# Patient Record
Sex: MALE | URBAN - METROPOLITAN AREA
[De-identification: names, ages, dates, MRNs, and addresses within clinical notes are randomized per-mention and may not be internally consistent; named-entity substitution may affect disease eponyms.]

---

## 2022-05-23 ENCOUNTER — OFFICE VISIT (OUTPATIENT)
Dept: ENT CLINIC | Age: 77
End: 2022-05-23
Payer: MEDICARE

## 2022-05-23 VITALS
RESPIRATION RATE: 20 BRPM | HEART RATE: 82 BPM | HEIGHT: 67 IN | SYSTOLIC BLOOD PRESSURE: 138 MMHG | WEIGHT: 228.8 LBS | BODY MASS INDEX: 35.91 KG/M2 | DIASTOLIC BLOOD PRESSURE: 80 MMHG | OXYGEN SATURATION: 94 %

## 2022-05-23 DIAGNOSIS — R09.89 GLOBUS SYNDROME: ICD-10-CM

## 2022-05-23 DIAGNOSIS — K11.8 PAROTID MASS: Primary | ICD-10-CM

## 2022-05-23 DIAGNOSIS — R13.14 PHARYNGOESOPHAGEAL DYSPHAGIA: ICD-10-CM

## 2022-05-23 PROCEDURE — 99204 OFFICE O/P NEW MOD 45 MIN: CPT | Performed by: OTOLARYNGOLOGY

## 2022-05-23 RX ORDER — GUAIFENESIN 100 MG/5ML
81 LIQUID (ML) ORAL DAILY
COMMUNITY

## 2022-05-23 RX ORDER — ATORVASTATIN CALCIUM 10 MG/1
TABLET, FILM COATED ORAL DAILY
COMMUNITY

## 2022-05-23 RX ORDER — GABAPENTIN 300 MG/1
300 CAPSULE ORAL EVERY MORNING
COMMUNITY

## 2022-05-23 NOTE — LETTER
5/23/2022    Patient: Laine Gonzalez   YOB: 1945   Date of Visit: 5/23/2022     Liane Tong, [address]  Via Fax: [phone]    Dear Liane Tong MD,      Thank you for referring Mr. Laine Gonzalez to [facility] [address] for evaluation. My notes for this consultation are attached. If you have questions, please do not hesitate to call me. I look forward to following your patient along with you.       Sincerely,    Lauren Holman MD

## 2022-05-23 NOTE — PROGRESS NOTES
Otolaryngology-Head and Neck Surgery  New Patient Visit     Patient: Page Cassidy  YOB: 1945  MRN: [id number]  Date of Service: 5/23/2022    Chief Complaint:  Throat concerns     History of Present Illness: Page Cassidy is a 78y.o. 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 Past Surgical History:   History reviewed. No pertinent surgical history. Medications:   Current Outpatient Medications   Medication Instructions    apixaban (ELIQUIS) 5 mg, Oral, 2 TIMES DAILY    aspirin 81 mg, Oral, DAILY    atorvastatin (LIPITOR) 10 mg tablet Oral, DAILY    gabapentin (NEURONTIN) 300 mg, Oral, EVERY MORNING       Allergies:   Not on File    Social History:   Social History     Tobacco Use    Smoking status: Former Smoker    Smokeless tobacco: Never Used   Vaping Use    Vaping Use: Never used   Substance Use Topics    Alcohol use: Never    Drug use: Never        Family History:  History reviewed. No pertinent family history. Review of Systems:    Consitutional: denies fever, excessive weight gain or loss. Eyes: denies diplopia, eye pain. Integumentary: denies new concerning skin lesions. Ears, Nose, Mouth, Throat: denies except as per HPI.   Endocrine: denies hot or cold intolerance, increased thirst.  Respiratory: denies cough, hemoptysis, wheezing  Gastrointestinal: denies trouble swallowing, nausea, emesis, regurgitation  Musculoskeletal: denies muscle weakness or wasting  Cardiovascular: denies chest pain, shortness of breath  Neurologic: denies seizures, numbness or tingling, syncope  Hematologic: denies easy bleeding or bruising    Physical Examination:   Vitals:    05/23/22 1430   BP: 138/80   Pulse: 82   Resp: 20   Height: 5' 7\" (1.702 m)   Weight: 228 lb 12.8 oz (103.8 kg)   SpO2: 94%        General: Comfortable, pleasant, appears stated age  Voice: Strong, speaking in full sentences, no stridor. Gravely voice quality   Face: No masses or lesions, facial strength symmetric   Ears: External ears unremarkable. Bilateral ear canal clear. Tympanic membrane clear and intact, with visible landmarks. Clear middle ear space  Nose: External nose unremarkable. Dorsum midline. Anterior rhinoscopy demonstrates no lesions. Septum midline. Turbinates without hypertrophy. Oral Cavity / Oropharynx: No trismus. Mucosa pink and moist. No lesions. Tongue is midline and mobile. Palate elevates symmetrically. Uvula midline. Tonsils unremarkable. Base of tongue soft. Floor of mouth soft. Neck: Fairly large palpable tail of parotid mass, left neck. No other adenopathy appreciated    Neurologic: CN II - XI intact. Normal gait      Assessment and Plan:   1. Parotid masses  - Limited information but on referral note, he has incidental finding of parotid mass bilateral on CT imaging  - I dont have a copy of the actual imaging  - Will request CT from [facility]  - Depending on this, consider checking dedicated CT neck with contrast if needed  - Otherwise will then plan for neck FNA  - Readily palpable left parotid mass on exam today    2. Globus  3.  Dysphagia  - Discussed with patient role of scope exam to better evaluate larynx and upper portion of swallowing system  - He is not keen on this today as he's been driving several hours between [place]   - Understands that this is next step  - Will arrange barium swallow   - Plan scope exam NOV    Discussed that we can find ENT closer to him in [place] if he'd like  He'd like to continue care with us     Will try and arrange next steps (imaging, FNA, barium swallow) in [place]   (I will order after I get a copy of his CT imaging from ER visit)        The patient was instructed to return to clinic if no improvement or progression of symptoms. Signs to watch out for reviewed.       MD [address] [facility]  [address] [place] [address]  [facility]  Office Phone: [phone]

## 2022-05-23 NOTE — PROGRESS NOTES
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         Visit Vitals  /80   Pulse 82   Resp 20   Ht 5' 7\" (1.702 m)   Wt 228 lb 12.8 oz (103.8 kg)   SpO2 94%   BMI 35.84 kg/m²

## 2022-05-26 ENCOUNTER — DOCUMENTATION ONLY (OUTPATIENT)
Dept: ENT CLINIC | Age: 77
End: 2022-05-26

## 2022-05-26 NOTE — PROGRESS NOTES
CT reviewed - bilaterla L > R parotid masses    Will arrange FNA and barium swallow   Will see if we can get a copy of the CT images of the neck

## 2022-05-31 ENCOUNTER — TELEPHONE (OUTPATIENT)
Dept: ENT CLINIC | Age: 77
End: 2022-05-31

## 2022-05-31 NOTE — TELEPHONE ENCOUNTER
LVM asking pt to call the office. Faxed referral to [facility]. would need to call [phone]  Option#3, #2, then #1 to schedule appt.      Their fax # is [phone]